# Patient Record
Sex: FEMALE | Race: WHITE | NOT HISPANIC OR LATINO | Employment: UNEMPLOYED | URBAN - METROPOLITAN AREA
[De-identification: names, ages, dates, MRNs, and addresses within clinical notes are randomized per-mention and may not be internally consistent; named-entity substitution may affect disease eponyms.]

---

## 2017-06-22 ENCOUNTER — TRANSCRIBE ORDERS (OUTPATIENT)
Dept: ADMINISTRATIVE | Facility: HOSPITAL | Age: 52
End: 2017-06-22

## 2017-06-22 DIAGNOSIS — Z12.31 VISIT FOR SCREENING MAMMOGRAM: Primary | ICD-10-CM

## 2017-07-07 ENCOUNTER — HOSPITAL ENCOUNTER (OUTPATIENT)
Dept: MAMMOGRAPHY | Facility: HOSPITAL | Age: 52
Discharge: HOME | End: 2017-07-07
Attending: OBSTETRICS & GYNECOLOGY | Admitting: OBSTETRICS & GYNECOLOGY

## 2017-07-07 DIAGNOSIS — Z12.31 VISIT FOR SCREENING MAMMOGRAM: ICD-10-CM

## 2017-07-07 PROCEDURE — 77063 BREAST TOMOSYNTHESIS BI: CPT | Performed by: RADIOLOGY

## 2017-07-07 PROCEDURE — 77067 SCR MAMMO BI INCL CAD: CPT | Performed by: RADIOLOGY

## 2017-07-07 PROCEDURE — G0202 SCR MAMMO BI INCL CAD: HCPCS

## 2017-07-07 PROCEDURE — 77063 BREAST TOMOSYNTHESIS BI: CPT

## 2018-06-13 ENCOUNTER — TRANSCRIBE ORDERS (OUTPATIENT)
Dept: ADMINISTRATIVE | Facility: HOSPITAL | Age: 53
End: 2018-06-13

## 2018-06-13 DIAGNOSIS — Z12.31 VISIT FOR SCREENING MAMMOGRAM: Primary | ICD-10-CM

## 2018-07-19 ENCOUNTER — HOSPITAL ENCOUNTER (OUTPATIENT)
Dept: MAMMOGRAPHY | Facility: HOSPITAL | Age: 53
Discharge: HOME | End: 2018-07-19
Attending: OBSTETRICS & GYNECOLOGY | Admitting: OBSTETRICS & GYNECOLOGY

## 2018-07-19 DIAGNOSIS — Z12.31 VISIT FOR SCREENING MAMMOGRAM: ICD-10-CM

## 2018-07-19 PROCEDURE — 77067 SCR MAMMO BI INCL CAD: CPT

## 2018-07-19 PROCEDURE — 77067 SCR MAMMO BI INCL CAD: CPT | Performed by: RADIOLOGY

## 2018-07-19 PROCEDURE — 77063 BREAST TOMOSYNTHESIS BI: CPT

## 2018-07-19 PROCEDURE — 77063 BREAST TOMOSYNTHESIS BI: CPT | Performed by: RADIOLOGY

## 2019-06-05 ENCOUNTER — TRANSCRIBE ORDERS (OUTPATIENT)
Dept: ADMINISTRATIVE | Facility: HOSPITAL | Age: 54
End: 2019-06-05

## 2021-04-27 PROBLEM — Z78.0 MENOPAUSE: Status: ACTIVE | Noted: 2021-04-27

## 2021-04-27 PROBLEM — Z90.710 HX OF TOTAL HYSTERECTOMY: Status: ACTIVE | Noted: 2021-04-27

## 2021-04-27 PROBLEM — N39.3 SUI (STRESS URINARY INCONTINENCE, FEMALE): Status: ACTIVE | Noted: 2021-04-27

## 2021-04-28 ENCOUNTER — OFFICE VISIT (OUTPATIENT)
Dept: OBSTETRICS AND GYNECOLOGY | Facility: CLINIC | Age: 56
End: 2021-04-28

## 2021-04-28 VITALS
HEIGHT: 66 IN | DIASTOLIC BLOOD PRESSURE: 70 MMHG | SYSTOLIC BLOOD PRESSURE: 110 MMHG | WEIGHT: 170 LBS | BODY MASS INDEX: 27.32 KG/M2

## 2021-04-28 DIAGNOSIS — Z01.419 WOMEN'S ANNUAL ROUTINE GYNECOLOGICAL EXAMINATION: Primary | ICD-10-CM

## 2021-04-28 DIAGNOSIS — Z12.31 BREAST CANCER SCREENING BY MAMMOGRAM: ICD-10-CM

## 2021-04-28 DIAGNOSIS — Z90.710 HX OF TOTAL HYSTERECTOMY: ICD-10-CM

## 2021-04-28 DIAGNOSIS — Z78.0 MENOPAUSE: ICD-10-CM

## 2021-04-28 PROCEDURE — 99396 PREV VISIT EST AGE 40-64: CPT | Performed by: OBSTETRICS & GYNECOLOGY

## 2021-04-28 RX ORDER — GLUCOSAMINE SULFATE 500 MG
CAPSULE ORAL
COMMUNITY

## 2021-04-28 RX ORDER — MULTIPLE VITAMINS W/ MINERALS TAB 9MG-400MCG
1 TAB ORAL DAILY
COMMUNITY

## 2021-04-28 NOTE — PROGRESS NOTES
GYN Annual Exam     CC - Here for annual exam.        GABRIELA  Eulalia Dodd is a 55 y.o. female, , who presents for annual well woman exam.  She is s/p TLH in  for fibroids.  Patient reports problems with: none. Since her last visit the patient was diagnosed with rosacea. Partner Status: Marital Status: .  New Partners since last visit: no.      Additional OB/GYN History   Current contraception: contraceptive methods: TLH    On HRT? No  Last Pap :   Last Completed Pap Smear       Status Date      PAP SMEAR Done 2016 nondiagnostic        History of abnormal Pap smear: no  Family history of uterine, colon, breast, or ovarian cancer: no  Performs monthly Self-Breast Exam: no  Last mammogram:   Last Completed Mammogram       Status Date      MAMMOGRAM Done 2018 MAMMO SCREENING DIGITAL TOMOSYNTHESIS BILATERAL W CAD     Patient has more history with this topic...        Last colonoscopy:   Last Completed Colonoscopy       Status Date      COLONOSCOPY Done 2018 normal per pt        Last DEXA: On 3/24/2020 and results were Normal  Exercises Regularly: yes  Feelings of Anxiety or Depression: no      Tobacco Usage?: No   OB History        2    Para   2    Term   2            AB        Living   2       SAB        TAB        Ectopic        Molar        Multiple        Live Births                    Health Maintenance   Topic Date Due   • Annual Gynecologic Pelvic and Breast Exam  Never done   • ANNUAL PHYSICAL  Never done   • ZOSTER VACCINE (1 of 2) Never done   • MAMMOGRAM  2020   • HEPATITIS C SCREENING  Never done   • PAP SMEAR  2021   • INFLUENZA VACCINE  2021   • COLONOSCOPY  2028   • TDAP/TD VACCINES (3 - Td) 2029   • COVID-19 Vaccine  Completed   • Pneumococcal Vaccine 0-64  Aged Out       The additional following portions of the patient's history were reviewed and updated as appropriate: allergies, current medications, past family history,  "past medical history, past social history, past surgical history and problem list.    Review of Systems   Constitutional: Negative.    HENT: Negative.    Eyes: Negative.    Respiratory: Negative.    Cardiovascular: Negative.    Gastrointestinal: Negative.    Endocrine: Negative.    Genitourinary: Negative.    Musculoskeletal: Negative.    Skin: Negative.    Allergic/Immunologic: Negative.    Neurological: Negative.    Hematological: Negative.    Psychiatric/Behavioral: Negative.      All other systems reviewed and are negative.     I have reviewed and agree with the HPI, ROS, and historical information as entered above. Kayley Vilchis MD    Objective   /70   Ht 166.4 cm (65.5\")   Wt 77.1 kg (170 lb)   BMI 27.86 kg/m²     Physical Exam  Vitals and nursing note reviewed. Exam conducted with a chaperone present.   Constitutional:       Appearance: She is well-developed.   HENT:      Head: Normocephalic and atraumatic.   Neck:      Thyroid: No thyroid mass or thyromegaly.   Cardiovascular:      Rate and Rhythm: Normal rate and regular rhythm.      Heart sounds: No murmur heard.     Pulmonary:      Effort: Pulmonary effort is normal. No retractions.      Breath sounds: Normal breath sounds. No wheezing, rhonchi or rales.   Chest:      Chest wall: No mass or tenderness.      Breasts:         Right: Normal. No mass, nipple discharge, skin change or tenderness.         Left: Normal. No mass, nipple discharge, skin change or tenderness.      Comments: Scars from reduction noted  Abdominal:      General: Bowel sounds are normal.      Palpations: Abdomen is soft. Abdomen is not rigid. There is no mass.      Tenderness: There is no abdominal tenderness. There is no guarding.      Hernia: No hernia is present. There is no hernia in the left inguinal area or right inguinal area.   Genitourinary:     General: Normal vulva.      Exam position: Lithotomy position.      Pubic Area: No rash.       Labia:         Right: " No rash, tenderness or lesion.         Left: No rash, tenderness or lesion.       Urethra: No urethral pain or urethral swelling.      Vagina: Normal. No vaginal discharge or lesions.      Uterus: Absent.       Adnexa:         Right: No mass, tenderness or fullness.          Left: No mass, tenderness or fullness.        Rectum: No external hemorrhoid.      Comments: Cervix surgically absent.  Vaginal cuff intact.  Musculoskeletal:      Cervical back: Normal range of motion. No muscular tenderness.   Neurological:      Mental Status: She is alert and oriented to person, place, and time.   Psychiatric:         Behavior: Behavior normal.            Assessment and Plan    Problem List Items Addressed This Visit        Genitourinary and Reproductive     Hx of total hysterectomy    Overview     TLH/bilateral salpingectomy in December 2014 for abnormal uterine bleeding and fibroids.         Menopause    Overview     DEXA scan on 3/24/2020 within normal limits.  Recommend repeat in 3 to 5 years.           Other Visit Diagnoses     Women's annual routine gynecological examination    -  Primary    Relevant Orders    Pap IG, HPV-hr    Breast cancer screening by mammogram        Relevant Orders    Mammo Screening Digital Tomosynthesis Bilateral With CAD          1. GYN annual well woman exam.   2. Reviewed monthly self breast exams.  Instructed to call with lumps, pain, or breast discharge.  Yearly mammograms ordered.  3. Ordered mammogram today.  4. Recommended use of Vitamin D and getting adequate calcium in her diet. (1500mg)  5. Reviewed exercise as a preventative health measures.   6. Symptoms of menopausal transition reviewed with patient.   7. Reccommended Flu Vaccine in Fall of each year.  8. RTC in 1 year or PRN with problems.    Kayley Vilchis MD  04/28/2021

## 2021-05-04 DIAGNOSIS — Z01.419 WOMEN'S ANNUAL ROUTINE GYNECOLOGICAL EXAMINATION: ICD-10-CM

## 2022-05-03 ENCOUNTER — OFFICE VISIT (OUTPATIENT)
Dept: OBSTETRICS AND GYNECOLOGY | Facility: CLINIC | Age: 57
End: 2022-05-03

## 2022-05-03 VITALS
WEIGHT: 182.2 LBS | BODY MASS INDEX: 29.28 KG/M2 | HEIGHT: 66 IN | DIASTOLIC BLOOD PRESSURE: 80 MMHG | SYSTOLIC BLOOD PRESSURE: 122 MMHG

## 2022-05-03 DIAGNOSIS — Z01.419 WOMEN'S ANNUAL ROUTINE GYNECOLOGICAL EXAMINATION: Primary | ICD-10-CM

## 2022-05-03 DIAGNOSIS — Z90.710 HX OF TOTAL HYSTERECTOMY: ICD-10-CM

## 2022-05-03 DIAGNOSIS — Z78.0 MENOPAUSE: ICD-10-CM

## 2022-05-03 DIAGNOSIS — Z12.31 BREAST CANCER SCREENING BY MAMMOGRAM: ICD-10-CM

## 2022-05-03 PROCEDURE — 99396 PREV VISIT EST AGE 40-64: CPT | Performed by: OBSTETRICS & GYNECOLOGY

## 2022-05-03 NOTE — PROGRESS NOTES
GYN Annual Exam     CC - Here for annual exam.        GABRIELA  Eulalia Dodd is a 56 y.o. female, , who presents for annual well woman exam.  She is postmenopausal.  Patient denies vaginal bleeding. ..  Patient reports problems with: vaginal dryness and dyspareunia. Patient states she does use lubricant for IC but still experiences dyspareunia. There were no changes to her medical or surgical history since her last visit.. Partner Status: Marital Status: .  New Partners since last visit: no.      Additional OB/GYN History   Current contraception: contraceptive methods: Post menopausal status  Desires to: do not start contraception  On HRT? No  Last Pap : 2021. Results: negative, negative HPV  Last Completed Pap Smear          Ordered - PAP SMEAR (Every 3 Years) Ordered on 2021  SCANNED - PAP SMEAR    2016  Done - nondiagnostic              History of abnormal Pap smear: no  Family history of uterine, colon, breast, or ovarian cancer: no  Performs monthly Self-Breast Exam: no  Last mammogram: scheduled 2022    Last Completed Mammogram     This patient has no relevant Health Maintenance data.        Last colonoscopy:   Last Completed Colonoscopy          COLORECTAL CANCER SCREENING  Completed    2018  COLONOSCOPY (Done - normal per pt)              Last DEXA: patient reports DEXA scan a couple years ago  Exercises Regularly: yes  Feelings of Anxiety or Depression: no      Tobacco Usage?: No   OB History        2    Para   2    Term   2            AB        Living   2       SAB        IAB        Ectopic        Molar        Multiple        Live Births                    Health Maintenance   Topic Date Due   • DXA SCAN  Never done   • ANNUAL PHYSICAL  Never done   • ZOSTER VACCINE (1 of 2) 2017   • HEPATITIS C SCREENING  Never done   • Annual Gynecologic Pelvic and Breast Exam  2022   • INFLUENZA VACCINE  2022   • PAP SMEAR   "04/28/2024   • TDAP/TD VACCINES (3 - Td or Tdap) 02/18/2029   • COVID-19 Vaccine  Completed   • COLORECTAL CANCER SCREENING  Completed   • Pneumococcal Vaccine 0-64  Aged Out       The additional following portions of the patient's history were reviewed and updated as appropriate: allergies, current medications, past family history, past medical history, past social history and past surgical history.    Review of Systems   Constitutional: Negative.    HENT: Negative.    Eyes: Negative.    Respiratory: Negative.    Cardiovascular: Negative.    Gastrointestinal: Negative.    Endocrine: Negative.    Genitourinary: Positive for dyspareunia.   Musculoskeletal: Negative.    Skin: Negative.    Allergic/Immunologic: Negative.    Neurological: Negative.    Hematological: Negative.    Psychiatric/Behavioral: Negative.        I have reviewed and agree with the HPI, ROS, and historical information as entered above. Kayley Vilchis MD    Objective   /80 (BP Location: Right arm, Patient Position: Sitting, Cuff Size: Adult)   Ht 166.4 cm (65.5\")   Wt 82.6 kg (182 lb 3.2 oz)   BMI 29.86 kg/m²     Physical Exam  Vitals and nursing note reviewed. Exam conducted with a chaperone present.   Constitutional:       Appearance: She is well-developed.   HENT:      Head: Normocephalic and atraumatic.   Neck:      Thyroid: No thyroid mass or thyromegaly.   Cardiovascular:      Rate and Rhythm: Normal rate and regular rhythm.      Heart sounds: No murmur heard.  Pulmonary:      Effort: Pulmonary effort is normal. No retractions.      Breath sounds: Normal breath sounds. No wheezing, rhonchi or rales.   Chest:      Chest wall: No mass or tenderness.   Breasts:      Right: Normal. No mass, nipple discharge, skin change or tenderness.      Left: Normal. No mass, nipple discharge, skin change or tenderness.       Abdominal:      General: Bowel sounds are normal.      Palpations: Abdomen is soft. Abdomen is not rigid. There is no " mass.      Tenderness: There is no abdominal tenderness. There is no guarding.      Hernia: No hernia is present. There is no hernia in the left inguinal area or right inguinal area.   Genitourinary:     General: Normal vulva.      Exam position: Lithotomy position.      Pubic Area: No rash.       Labia:         Right: No rash, tenderness or lesion.         Left: No rash, tenderness or lesion.       Urethra: No urethral pain or urethral swelling.      Vagina: Normal. No vaginal discharge or lesions.      Uterus: Absent.       Adnexa:         Right: No mass, tenderness or fullness.          Left: No mass, tenderness or fullness.        Rectum: No external hemorrhoid.      Comments: Cervix surgically absent.  Vaginal cuff intact.  Musculoskeletal:      Cervical back: Normal range of motion. No muscular tenderness.   Neurological:      Mental Status: She is alert and oriented to person, place, and time.   Psychiatric:         Behavior: Behavior normal.            Assessment and Plan    Problem List Items Addressed This Visit        Genitourinary and Reproductive     Hx of total hysterectomy    Overview     TLH/bilateral salpingectomy in December 2014 for abnormal uterine bleeding and fibroids.           Menopause    Overview     DEXA scan on 3/24/2020 within normal limits.  Recommend repeat in 3 to 5 years.             Other Visit Diagnoses     Women's annual routine gynecological examination    -  Primary    Breast cancer screening by mammogram          Dyspareunia-patient reports she is okay with lubricant.  Discussed pelvic floor physical therapy.  She is not interested at this time.  Discussed vaginal estrogen.  Patient not interested either.    1. GYN annual well woman exam.   2. Reviewed monthly self breast exams.  Instructed to call with lumps, pain, or breast discharge.  Yearly mammograms ordered.  3. Ordered mammogram today.  4. Recommended use of Vitamin D and getting adequate calcium in her diet.  (1500mg)  5. Reviewed exercise as a preventative health measures.   6. Reviewed BMI and weight loss as preventative health measures.   7. Symptoms of menopausal transition reviewed with patient.   8. Reccommended Flu Vaccine in Fall of each year.  9. RTC in 1 year or PRN with problems.  10. No follow-ups on file.     Kayley Vilchis MD  05/03/2022

## 2023-05-08 ENCOUNTER — OFFICE VISIT (OUTPATIENT)
Dept: OBSTETRICS AND GYNECOLOGY | Facility: CLINIC | Age: 58
End: 2023-05-08
Payer: COMMERCIAL

## 2023-05-08 VITALS
SYSTOLIC BLOOD PRESSURE: 118 MMHG | BODY MASS INDEX: 29.67 KG/M2 | HEIGHT: 66 IN | WEIGHT: 184.6 LBS | DIASTOLIC BLOOD PRESSURE: 74 MMHG

## 2023-05-08 DIAGNOSIS — Z01.419 WOMEN'S ANNUAL ROUTINE GYNECOLOGICAL EXAMINATION: Primary | ICD-10-CM

## 2023-05-08 DIAGNOSIS — N89.8 VAGINAL DRYNESS: ICD-10-CM

## 2023-05-08 DIAGNOSIS — Z90.710 HX OF TOTAL HYSTERECTOMY: ICD-10-CM

## 2023-05-08 DIAGNOSIS — Z78.0 MENOPAUSE: ICD-10-CM

## 2023-05-08 DIAGNOSIS — N94.10 FEMALE DYSPAREUNIA: ICD-10-CM

## 2023-05-08 DIAGNOSIS — N39.3 SUI (STRESS URINARY INCONTINENCE, FEMALE): ICD-10-CM

## 2023-05-08 RX ORDER — ESTRADIOL 0.1 MG/G
CREAM VAGINAL
Qty: 1 EACH | Refills: 12 | Status: SHIPPED | OUTPATIENT
Start: 2023-05-08

## 2023-05-08 NOTE — PROGRESS NOTES
Gynecologic Annual Exam Note        GYN Annual Exam     CC - Here for annual exam.        HPI  Eulalia Dodd is a 57 y.o. female, , who presents for annual well woman exam as a established patient.  She is s/p TLH bilateral salpingectomy in Dec 2014 for fibroids and AUB.. Denies vaginal bleeding.  Patient reports problems with: hot flashes and vaginal dryness. There were no changes to her medical or surgical history since her last visit.. Partner Status: Marital Status: .  She is is sexually active. She has not had new partners.. STD testing recommendations have been explained to the patient and she does not desire STD testing.    Pt reports hot flashes for past 6 months. Pt also reports dyspareunia for about 6 months. Pt went to urgent care on 4/3/23 and got prescribed for BV/yeast which results were negative and pt states it is false negative since they made her swab herself. Pt currently denies vaginal irritation, discharge, itching or odor.     Additional OB/GYN History   On HRT? No    Last Pap : 21. Results: negative. HPV: negative.   Last Completed Pap Smear          PAP SMEAR (Every 3 Years) Next due on 2021  SCANNED - PAP SMEAR    2016  Done - nondiagnostic              History of abnormal Pap smear: no  Family history of uterine, colon, breast, or ovarian cancer: no  Performs monthly Self-Breast Exam: no  Last mammogram: 22. Done at [facility]. Records in [facility].    Last Completed Mammogram          Ordered - MAMMOGRAM (Yearly) Ordered on 2022  Outside Procedure: HC MAMMOGRAM SCREENING BILAT DIGITAL W CAD    2018  Mammo Screening Digital Tomosynthesis Bilateral With CAD    2017  Mammo Screening Digital Tomosynthesis Bilateral With CAD    2016  Mammo screening digital tomosynthesis bilateral w cad    2015  MAMMOGRAPHY SCREENING BILATERAL    Only the first 5 history entries have been loaded, but more  history exists.              Last colonoscopy: has had a colonoscopy 6 year(s) ago.    Last Completed Colonoscopy          COLORECTAL CANCER SCREENING (COLONOSCOPY - Every 10 Years) Next due on 2018  COLONOSCOPY (Done - normal per pt)                  Last bone density scan (DEXA): On 3/24/2020 and results were Normal  Exercises Regularly: yes  Feelings of Anxiety or Depression: no      Tobacco Usage?: No       Current Outpatient Medications:   •  Calcium-Vitamin D-Vitamin K (CHEWABLE CALCIUM PO), Take  by mouth., Disp: , Rfl:   •  COLLAGEN PO, Take  by mouth., Disp: , Rfl:   •  Glucosamine 500 MG capsule, Take  by mouth., Disp: , Rfl:   •  linaclotide (LINZESS) 72 MCG capsule capsule, As Needed., Disp: , Rfl:   •  multivitamin with minerals tablet tablet, Take 1 tablet by mouth Daily., Disp: , Rfl:   •  Probiotic Product (PROBIOTIC PO), Take  by mouth., Disp: , Rfl:   •  VITAMIN D PO, Take  by mouth., Disp: , Rfl:   •  estradiol (ESTRACE VAGINAL) 0.1 MG/GM vaginal cream, Apply pea-sized amount to vaginal opening 3 times weekly, Disp: 1 each, Rfl: 12    Patient denies the need for medication refills today.    OB History        2    Para   2    Term   2            AB        Living   2       SAB        IAB        Ectopic        Molar        Multiple        Live Births                    Past Medical History:   Diagnosis Date   • Anemia    • Fibroid    • Leiomyoma     uterine firbroids    • PONV (postoperative nausea and vomiting)    • Rh incompatibility    • Stress incontinence    • Varicella         Past Surgical History:   Procedure Laterality Date   • ABDOMINOPLASTY     • CARPAL TUNNEL RELEASE     • CHOLECYSTECTOMY         • ENDOMETRIAL BIOPSY     • HAND SURGERY      tendon    • HYSTERECTOMY      age 48   • LAPAROSCOPIC CHOLECYSTECTOMY     • MASTOPEXY     • TOTAL ABDOMINAL HYSTERECTOMY     • TUBAL ABDOMINAL LIGATION         Health Maintenance   Topic Date Due   • DXA SCAN  Never  "done   • ZOSTER VACCINE (1 of 2) 12/19/2017   • HEPATITIS C SCREENING  Never done   • ANNUAL PHYSICAL  Never done   • COVID-19 Vaccine (4 - Booster for Pfizer series) 01/16/2022   • Annual Gynecologic Pelvic and Breast Exam  05/04/2023   • MAMMOGRAM  07/05/2023   • INFLUENZA VACCINE  08/01/2023   • PAP SMEAR  04/28/2024   • COLORECTAL CANCER SCREENING  04/01/2028   • TDAP/TD VACCINES (3 - Td or Tdap) 02/18/2029   • Pneumococcal Vaccine 0-64  Aged Out       The additional following portions of the patient's history were reviewed and updated as appropriate: allergies, current medications, past family history, past medical history, past social history, past surgical history and problem list.    Review of Systems   Constitutional: Negative.    HENT: Negative.    Eyes: Negative.    Respiratory: Negative.    Cardiovascular: Negative.    Gastrointestinal: Negative.    Endocrine: Negative.    Genitourinary: Positive for dyspareunia.   Musculoskeletal: Negative.    Skin: Negative.    Allergic/Immunologic: Negative.    Neurological: Negative.    Hematological: Negative.    Psychiatric/Behavioral: Negative.        I have reviewed and agree with the HPI, ROS, and historical information as entered above. Kayley Vilchis MD    Objective   /74   Ht 166.4 cm (65.5\")   Wt 83.7 kg (184 lb 9.6 oz)   BMI 30.25 kg/m²     Physical Exam  Vitals and nursing note reviewed. Exam conducted with a chaperone present.   Constitutional:       Appearance: She is well-developed.   HENT:      Head: Normocephalic and atraumatic.   Neck:      Thyroid: No thyroid mass or thyromegaly.   Cardiovascular:      Rate and Rhythm: Normal rate and regular rhythm.      Heart sounds: No murmur heard.  Pulmonary:      Effort: Pulmonary effort is normal. No retractions.      Breath sounds: Normal breath sounds. No wheezing, rhonchi or rales.   Chest:      Chest wall: No mass or tenderness.   Breasts:     Right: Normal. No mass, nipple discharge, skin " change or tenderness.      Left: Normal. No mass, nipple discharge, skin change or tenderness.   Abdominal:      General: Bowel sounds are normal.      Palpations: Abdomen is soft. Abdomen is not rigid. There is no mass.      Tenderness: There is no abdominal tenderness. There is no guarding.      Hernia: No hernia is present. There is no hernia in the left inguinal area or right inguinal area.   Genitourinary:     General: Normal vulva.      Exam position: Lithotomy position.      Pubic Area: No rash.       Labia:         Right: No rash, tenderness or lesion.         Left: No rash, tenderness or lesion.       Urethra: No urethral pain or urethral swelling.      Vagina: Normal. No vaginal discharge or lesions.      Uterus: Absent.       Adnexa:         Right: No mass, tenderness or fullness.          Left: No mass, tenderness or fullness.        Rectum: No external hemorrhoid.      Comments: Cervix surgically absent.  Vaginal cuff intact.  Musculoskeletal:      Cervical back: Normal range of motion. No muscular tenderness.   Neurological:      Mental Status: She is alert and oriented to person, place, and time.   Psychiatric:         Behavior: Behavior normal.            Assessment and Plan    Problem List Items Addressed This Visit        Genitourinary and Reproductive     Hx of total hysterectomy    Overview     TLH/bilateral salpingectomy in December 2014 for abnormal uterine bleeding and fibroids.         MENDOZA (stress urinary incontinence, female)    Relevant Orders    Ambulatory Referral to Physical Therapy Pelvic Floor, Evaluate and treat    Menopause    Overview     DEXA scan on 3/24/2020 within normal limits.  Recommend repeat in 3 to 5 years.         Female dyspareunia    Overview     5/8/2023-patient struggling with dyspareunia and vaginal dryness.  We discussed options for treatment.  Replens is not working for her.  Plan for Estrace cream.  Advised on use.  We will also refer to pelvic floor physical  therapy.  Patient also has occasional stress urinary incontinence.         Relevant Medications    estradiol (ESTRACE VAGINAL) 0.1 MG/GM vaginal cream    Other Relevant Orders    Ambulatory Referral to Physical Therapy Pelvic Floor, Evaluate and treat    Vaginal dryness    Relevant Medications    estradiol (ESTRACE VAGINAL) 0.1 MG/GM vaginal cream   Other Visit Diagnoses     Women's annual routine gynecological examination    -  Primary    Relevant Orders    Mammo Screening Digital Tomosynthesis Bilateral With CAD          1. GYN annual well woman exam.   2. Reviewed monthly self breast exams.  Instructed to call with lumps, pain, or breast discharge.  Yearly mammograms ordered.  3. Ordered mammogram today.  4. Recommended use of Vitamin D and getting adequate calcium in her diet. (1500mg)  5. Osteoporosis screening ordered today.  6. Reviewed exercise as a preventative health measures.   7. Reviewed BMI and weight loss as preventative health measures.   8. Recommended Flu Vaccine in Fall of each year.  9. Instructed on [place] exercises and gave patient educational information.  10. Referral made for Pelvic Floor Physical Therapy  11. RTC in 1 year or PRN with problems.  12. Return in about 1 year (around 5/8/2024), or if symptoms worsen or fail to improve, for Annual physical.     Kayley Vilchis MD  05/08/2023

## 2024-05-13 ENCOUNTER — OFFICE VISIT (OUTPATIENT)
Dept: OBSTETRICS AND GYNECOLOGY | Facility: CLINIC | Age: 59
End: 2024-05-13
Payer: COMMERCIAL

## 2024-05-13 VITALS
DIASTOLIC BLOOD PRESSURE: 78 MMHG | SYSTOLIC BLOOD PRESSURE: 116 MMHG | BODY MASS INDEX: 30.53 KG/M2 | HEIGHT: 66 IN | WEIGHT: 190 LBS

## 2024-05-13 DIAGNOSIS — Z01.419 WOMEN'S ANNUAL ROUTINE GYNECOLOGICAL EXAMINATION: Primary | ICD-10-CM

## 2024-05-13 DIAGNOSIS — Z90.710 HX OF TOTAL HYSTERECTOMY: ICD-10-CM

## 2024-05-13 DIAGNOSIS — N94.10 FEMALE DYSPAREUNIA: ICD-10-CM

## 2024-05-13 DIAGNOSIS — N89.8 VAGINAL DRYNESS: ICD-10-CM

## 2024-05-13 PROCEDURE — 99396 PREV VISIT EST AGE 40-64: CPT | Performed by: OBSTETRICS & GYNECOLOGY

## 2024-05-13 RX ORDER — ESTRADIOL 0.1 MG/G
CREAM VAGINAL
Qty: 1 EACH | Refills: 12 | Status: SHIPPED | OUTPATIENT
Start: 2024-05-13

## 2024-05-13 NOTE — PROGRESS NOTES
Gynecologic Annual Exam Note        GYN Annual Exam     CC - Here for annual exam.        HPI  Eulalia Dodd is a 58 y.o. female, , who presents for annual well woman exam as a established patient.  She is s/p TLH bilateral salpingectomy in Dec 2014 for fibroids and AUB. Denies vaginal bleeding.   There were no changes to her medical or surgical history since her last visit. Marital Status: .  She is sexually active. She has not had new partners.. STD testing recommendations have been explained to the patient and she declines STD testing.    At her last annual, patient reported dyspareunia and vaginal dryness. She was also referred to pelvic floor physical therapy.-Pt states she did not end up going to pelvic floor therapy. Pt states her stress urinary incontinence she reported at last visit has improved.     Pt states estradiol has helped the vaginal dryness. Pt states she still has intermittent dyspareunia. Pt states there are times that she has symptoms of yeast or BV. Pt currently denies any symptoms. Pt wants to know if there's anything she can take to prevent these in the future.     Additional OB/GYN History   On HRT? Yes. Details: estradiol cream    Last Pap : 2021. Results: negative. HPV: negative.   Last Completed Pap Smear       This patient has no relevant Health Maintenance data.          History of abnormal Pap smear: no  Family history of uterine, colon, breast, or ovarian cancer: no  Performs monthly Self-Breast Exam: no  Last mammogram: 2023. Done at [facility]. There is a copy in the chart.    Last Completed Mammogram       This patient has no relevant Health Maintenance data.          Last colonoscopy: has had a colonoscopy 6 years ago    Last Completed Colonoscopy            COLORECTAL CANCER SCREENING (COLONOSCOPY - Every 10 Years) Next due on 2018  COLONOSCOPY (Done - normal per pt)                    Her last bone density scan was 4 years ago  "and results were Normal  \"3/24/2020 and results were Normal\"   Exercises Regularly: yes  Feelings of Anxiety or Depression: no      Tobacco Usage?: No       Current Outpatient Medications:     APPLE CIDER VINEGAR PO, Take  by mouth., Disp: , Rfl:     Ascorbic Acid (VITAMIN C PO), Take  by mouth., Disp: , Rfl:     Calcium-Vitamin D-Vitamin K (CHEWABLE CALCIUM PO), Take  by mouth., Disp: , Rfl:     estradiol (ESTRACE VAGINAL) 0.1 MG/GM vaginal cream, Apply pea-sized amount to vaginal opening 3 times weekly, Disp: 1 each, Rfl: 12    Glucosamine 500 MG capsule, Take  by mouth., Disp: , Rfl:     multivitamin with minerals tablet tablet, Take 1 tablet by mouth Daily., Disp: , Rfl:     Probiotic Product (PROBIOTIC PO), Take  by mouth., Disp: , Rfl:     VITAMIN D PO, Take  by mouth., Disp: , Rfl:     Patient is requesting refills of estradiol cream.    OB History          2    Para   2    Term   2            AB        Living   2         SAB        IAB        Ectopic        Molar        Multiple        Live Births                    Past Medical History:   Diagnosis Date    Anemia     Fibroid     Leiomyoma     uterine firbroids     PONV (postoperative nausea and vomiting)     Rh incompatibility     Stress incontinence     Varicella         Past Surgical History:   Procedure Laterality Date    ABDOMINOPLASTY      CARPAL TUNNEL RELEASE      CHOLECYSTECTOMY      2017    ENDOMETRIAL BIOPSY      HAND SURGERY      tendon     HYSTERECTOMY      age 48    LAPAROSCOPIC CHOLECYSTECTOMY      LAPAROSCOPIC HYSTERECTOMY      MASTOPEXY      SALPINGECTOMY         Health Maintenance   Topic Date Due    DXA SCAN  Never done    ZOSTER VACCINE (1 of 2) 2017    HEPATITIS C SCREENING  Never done    ANNUAL PHYSICAL  Never done    COVID-19 Vaccine (4 - 2023-24 season) 2023    PAP SMEAR  2024    BMI FOLLOWUP  2024    Annual Gynecologic Pelvic and Breast Exam  2024    MAMMOGRAM  2024    INFLUENZA " "VACCINE  08/01/2024    COLORECTAL CANCER SCREENING  04/01/2028    TDAP/TD VACCINES (3 - Td or Tdap) 02/18/2029    Pneumococcal Vaccine 0-64  Aged Out       The additional following portions of the patient's history were reviewed and updated as appropriate: allergies, current medications, past family history, past medical history, past social history, past surgical history, and problem list.    Review of Systems   Constitutional: Negative.    HENT: Negative.     Eyes: Negative.    Respiratory: Negative.     Cardiovascular: Negative.    Gastrointestinal: Negative.    Endocrine: Negative.    Genitourinary:  Positive for dyspareunia.   Musculoskeletal: Negative.    Skin: Negative.    Allergic/Immunologic: Negative.    Neurological: Negative.    Hematological: Negative.    Psychiatric/Behavioral: Negative.         I have reviewed and agree with the HPI, ROS, and historical information as entered above. Kayley Vilchis MD      Objective   /78   Ht 166.4 cm (65.5\")   Wt 86.2 kg (190 lb)   BMI 31.14 kg/m²     Physical Exam  Vitals and nursing note reviewed. Exam conducted with a chaperone present.   Constitutional:       Appearance: She is well-developed.   HENT:      Head: Normocephalic and atraumatic.   Neck:      Thyroid: No thyroid mass or thyromegaly.   Cardiovascular:      Rate and Rhythm: Normal rate and regular rhythm.      Heart sounds: No murmur heard.  Pulmonary:      Effort: Pulmonary effort is normal. No retractions.      Breath sounds: Normal breath sounds. No wheezing, rhonchi or rales.   Chest:      Chest wall: No mass or tenderness.   Breasts:     Right: Normal. No mass, nipple discharge, skin change or tenderness.      Left: Normal. No mass, nipple discharge, skin change or tenderness.   Abdominal:      General: Bowel sounds are normal.      Palpations: Abdomen is soft. Abdomen is not rigid. There is no mass.      Tenderness: There is no abdominal tenderness. There is no guarding.      " Hernia: No hernia is present. There is no hernia in the left inguinal area or right inguinal area.   Genitourinary:     General: Normal vulva.      Exam position: Lithotomy position.      Pubic Area: No rash.       Labia:         Right: No rash, tenderness or lesion.         Left: No rash, tenderness or lesion.       Urethra: No urethral pain or urethral swelling.      Vagina: Normal. No vaginal discharge or lesions.      Uterus: Absent.       Adnexa:         Right: No mass, tenderness or fullness.          Left: No mass, tenderness or fullness.        Rectum: No external hemorrhoid.      Comments: Cervix surgically absent.  Vaginal cuff intact.  Tightened alleviator muscles noted at the introital opening.  Musculoskeletal:      Cervical back: Normal range of motion. No muscular tenderness.   Neurological:      Mental Status: She is alert and oriented to person, place, and time.   Psychiatric:         Behavior: Behavior normal.            Assessment and Plan    Problem List Items Addressed This Visit          Genitourinary and Reproductive     Hx of total hysterectomy    Overview     TLH/bilateral salpingectomy in December 2014 for abnormal uterine bleeding and fibroids.         Female dyspareunia    Overview     5/13/2024-patient reports improvement with Estrace however still having dyspareunia more times than not.  We discussed pelvic floor physical therapy patient is amenable and would like to try.  Will put in a consult.         Relevant Medications    estradiol (ESTRACE VAGINAL) 0.1 MG/GM vaginal cream    Other Relevant Orders    Ambulatory Referral to Physical Therapy for Evaluation & Treatment (Completed)    Vaginal dryness    Relevant Medications    estradiol (ESTRACE VAGINAL) 0.1 MG/GM vaginal cream     Other Visit Diagnoses       Women's annual routine gynecological examination    -  Primary            GYN annual well woman exam.   Reviewed monthly self breast exams.  Instructed to call with lumps, pain,  or breast discharge.  Yearly mammograms ordered.  Ordered mammogram today.  Recommended use of Vitamin D and getting adequate calcium in her diet. (1500mg)  Reviewed exercise as a preventative health measures.   Reviewed BMI and weight loss as preventative health measures.   Recommended Flu Vaccine in Fall of each year.  Referral made for Pelvic Floor Physical Therapy  RTC in 1 year or PRN with problems.  Return in about 1 year (around 5/13/2025) for Annual physical.         Kayley Vilchis MD  05/13/2024

## 2025-06-13 ENCOUNTER — OFFICE VISIT (OUTPATIENT)
Dept: OBSTETRICS AND GYNECOLOGY | Facility: CLINIC | Age: 60
End: 2025-06-13
Payer: COMMERCIAL

## 2025-06-13 VITALS — BODY MASS INDEX: 31.63 KG/M2 | WEIGHT: 193 LBS | DIASTOLIC BLOOD PRESSURE: 86 MMHG | SYSTOLIC BLOOD PRESSURE: 118 MMHG

## 2025-06-13 DIAGNOSIS — N94.10 FEMALE DYSPAREUNIA: ICD-10-CM

## 2025-06-13 DIAGNOSIS — N89.8 VAGINAL DRYNESS: ICD-10-CM

## 2025-06-13 RX ORDER — ESTRADIOL 0.1 MG/G
CREAM VAGINAL
Qty: 1 EACH | Refills: 12 | Status: SHIPPED | OUTPATIENT
Start: 2025-06-13

## 2025-06-13 NOTE — PROGRESS NOTES
Gynecologic Annual Exam Note        GYN Annual Exam     CC - Here for annual exam.        HPI  Eulalia Dodd is a 59 y.o. female, , who presents for annual well woman exam as a established patient.  She is s/p TLH/BS in 2014 for fibroids and AUB. Denies vaginal bleeding.   There were no changes to her medical or surgical history since her last visit. Marital Status: .  She is sexually active. She has not had new partners. STD testing recommendations have been explained to the patient and she declines STD testing.    The patient would like to discuss the following complaints today: She has been seeing PFPT, and they recommended dilators, this has been helping.     Additional OB/GYN History   On HRT? Yes. Details: estrace    Last Pap : 21. Results: negative. HPV: negative.   Last Completed Pap Smear    This patient has no relevant Health Maintenance data.       History of abnormal Pap smear: no  Family history of uterine, colon, breast, or ovarian cancer: no  Performs monthly Self-Breast Exam: no  Last mammogram: 26.  There is a copy in the chart.- She is scheduled for next month.     Last Completed Mammogram            Upcoming       MAMMOGRAM (Every 2 Years) Next due on 2024  Mammo Screening Digital Tomosynthesis Bilateral With CAD    2023  MAMMO SCREENING DIGITAL TOMOSYNTHESIS BILATERAL W CAD    2022  MAMMO SCREENING DIGITAL TOMOSYNTHESIS BILATERAL W CAD    2021  MAMMO SCREENING DIGITAL TOMOSYNTHESIS BILATERAL W CAD    2020  MAMMO SCREENING DIGITAL TOMOSYNTHESIS BILATERAL W CAD     Only the first 5 history entries have been loaded, but more history exists.                        Last colonoscopy: has had a colonoscopy 20     Last Completed Colonoscopy            Upcoming       COLORECTAL CANCER SCREENING (COLONOSCOPY - Every 10 Years) Next due on 2018  COLONOSCOPY (Done - normal per pt)                             Her last bone density scan was 5 years ago and results were Normal  Exercises Regularly: yes  Feelings of Anxiety or Depression: no      Tobacco Usage?: No       Current Outpatient Medications:     APPLE CIDER VINEGAR PO, Take  by mouth., Disp: , Rfl:     Ascorbic Acid (VITAMIN C PO), Take  by mouth., Disp: , Rfl:     Calcium-Vitamin D-Vitamin K (CHEWABLE CALCIUM PO), Take  by mouth., Disp: , Rfl:     estradiol (ESTRACE VAGINAL) 0.1 MG/GM vaginal cream, Apply pea-sized amount to vaginal opening 3 times weekly, Disp: 1 each, Rfl: 12    Glucosamine 500 MG capsule, Take  by mouth., Disp: , Rfl:     metroNIDAZOLE (METROCREAM) 0.75 % cream, , Disp: , Rfl:     multivitamin with minerals tablet tablet, Take 1 tablet by mouth Daily., Disp: , Rfl:     Probiotic Product (PROBIOTIC PO), Take  by mouth., Disp: , Rfl:     VITAMIN D PO, Take  by mouth., Disp: , Rfl:     Patient is requesting refills of estrace.    OB History          2    Para   2    Term   2            AB        Living   2         SAB        IAB        Ectopic        Molar        Multiple        Live Births                    Past Medical History:   Diagnosis Date    Anemia     Fibroid     Leiomyoma     uterine firbroids     PONV (postoperative nausea and vomiting)     Stress incontinence     Varicella         Past Surgical History:   Procedure Laterality Date    ABDOMINOPLASTY      CARPAL TUNNEL RELEASE      CHOLECYSTECTOMY          ENDOMETRIAL BIOPSY      HAND SURGERY      tendon     HYSTERECTOMY      age 48    LAPAROSCOPIC CHOLECYSTECTOMY      LAPAROSCOPIC HYSTERECTOMY      MASTOPEXY      SALPINGECTOMY      TOTAL ABDOMINAL HYSTERECTOMY      TUBAL ABDOMINAL LIGATION         Health Maintenance   Topic Date Due    Pneumococcal Vaccine 50+ (1 of 1 - PCV) Never done    ZOSTER VACCINE (1 of 2) 2017    HEPATITIS C SCREENING  Never done    ANNUAL PHYSICAL  Never done    COVID-19 Vaccine (2024- season) 2024    Annual  Gynecologic Pelvic and Breast Exam  05/14/2025    INFLUENZA VACCINE  07/01/2025    MAMMOGRAM  07/09/2026    COLORECTAL CANCER SCREENING  04/01/2028    TDAP/TD VACCINES (3 - Td or Tdap) 02/18/2029       The additional following portions of the patient's history were reviewed and updated as appropriate: allergies, current medications, past family history, past medical history, past social history, past surgical history, and problem list.    Review of Systems   Constitutional: Negative.    HENT: Negative.     Eyes: Negative.    Respiratory: Negative.     Cardiovascular: Negative.    Gastrointestinal: Negative.    Endocrine: Negative.    Genitourinary: Negative.    Musculoskeletal: Negative.    Skin: Negative.    Allergic/Immunologic: Negative.    Neurological: Negative.    Hematological: Negative.    Psychiatric/Behavioral: Negative.         I have reviewed and agree with the HPI, ROS, and historical information as entered above. JUAN LUIS Watson      Objective   /86   Wt 87.5 kg (193 lb)   Breastfeeding No   BMI 31.63 kg/m²     Physical Exam  Vitals and nursing note reviewed. Exam conducted with a chaperone present.   Constitutional:       General: She is not in acute distress.     Appearance: Normal appearance. She is well-developed. She is not ill-appearing.   Neck:      Thyroid: No thyroid mass or thyromegaly.   Pulmonary:      Effort: Pulmonary effort is normal. No respiratory distress or retractions.   Chest:      Chest wall: No mass.   Breasts:     Right: Normal. No mass, nipple discharge, skin change or tenderness.      Left: Normal. No mass, nipple discharge, skin change or tenderness.      Comments: Fibrocystic tissue present bilaterally  Reduction scars present bilaterally  Abdominal:      General: There is no distension.      Palpations: Abdomen is soft. Abdomen is not rigid. There is no mass.      Tenderness: There is no abdominal tenderness. There is no guarding or rebound.      Hernia: No  hernia is present.   Genitourinary:     General: Normal vulva.      Exam position: Lithotomy position.      Labia:         Right: No rash, tenderness or lesion.         Left: No rash, tenderness or lesion.       Vagina: Normal. No vaginal discharge, bleeding or lesions.      Uterus: Absent.       Adnexa: Right adnexa normal and left adnexa normal.        Right: No mass or tenderness.          Left: No mass or tenderness.        Rectum: Normal. No external hemorrhoid.      Comments: Vaginal cuff intact  Musculoskeletal:      Cervical back: No muscular tenderness.   Skin:     General: Skin is warm and dry.   Neurological:      Mental Status: She is alert and oriented to person, place, and time.   Psychiatric:         Mood and Affect: Mood normal.         Behavior: Behavior normal.            Assessment and Plan    Problem List Items Addressed This Visit       Female dyspareunia          Overview    5/13/2024-patient reports improvement with Estrace however still having dyspareunia more times than not.  We discussed pelvic floor physical therapy patient is amenable and would like to try.  Will put in a consult.    6/13/25-She has been seeing PFPT and they recommended dilators, this has been helping. Still using estrace cream.         Relevant Medications    estradiol (ESTRACE VAGINAL) 0.1 MG/GM vaginal cream    Vaginal dryness    Relevant Medications    estradiol (ESTRACE VAGINAL) 0.1 MG/GM vaginal cream       GYN annual well woman exam.   Estrace cream refills.   Up to date on mammogram and colonoscopy.   Reviewed monthly self breast exams.  Instructed to call with lumps, pain, or breast discharge.  Yearly mammograms ordered.  Reviewed exercise as a preventative health measures.   RTC in 1 year or PRN with problems.  Return in about 1 year (around 6/13/2026) for Annual physical.         Yaz Whitt, JUAN LUIS  06/13/2025